# Patient Record
Sex: FEMALE | Race: WHITE | URBAN - METROPOLITAN AREA
[De-identification: names, ages, dates, MRNs, and addresses within clinical notes are randomized per-mention and may not be internally consistent; named-entity substitution may affect disease eponyms.]

---

## 2022-05-09 ENCOUNTER — OFFICE VISIT (OUTPATIENT)
Dept: URBAN - METROPOLITAN AREA CLINIC 32 | Facility: CLINIC | Age: 42
End: 2022-05-09
Payer: COMMERCIAL

## 2022-05-09 DIAGNOSIS — H33.311 HORSESHOE TEAR OF RETINA WITHOUT DETACHMENT, RIGHT EYE: Primary | ICD-10-CM

## 2022-05-09 PROCEDURE — 92134 CPTRZ OPH DX IMG PST SGM RTA: CPT | Performed by: OPHTHALMOLOGY

## 2022-05-09 PROCEDURE — 92004 COMPRE OPH EXAM NEW PT 1/>: CPT | Performed by: OPHTHALMOLOGY

## 2022-05-09 ASSESSMENT — KERATOMETRY
OS: 44.25
OD: 45.50

## 2022-05-09 ASSESSMENT — INTRAOCULAR PRESSURE
OD: 23
OS: 20

## 2022-05-09 NOTE — IMPRESSION/PLAN
Impression: Horseshoe tear of retina without detachment, right eye: H33.311. Plan:  ? 2 sup HSTs today with CL exam 
  Return tomorrow CG for optos OD + barrier laser  RD return precautions discussed

## 2022-05-10 ENCOUNTER — OFFICE VISIT (OUTPATIENT)
Dept: URBAN - METROPOLITAN AREA CLINIC 17 | Facility: CLINIC | Age: 42
End: 2022-05-10
Payer: COMMERCIAL

## 2022-05-10 PROCEDURE — 67145 PROPH RTA DTCHMNT PC: CPT | Performed by: OPHTHALMOLOGY

## 2022-05-10 ASSESSMENT — INTRAOCULAR PRESSURE
OS: 18
OD: 18

## 2022-05-10 NOTE — IMPRESSION/PLAN
Impression: Horseshoe tear of retina without detachment, right eye: H33.311. Plan: ? 2 sup HSTs today with CL exam 
  Barrier OD today  2w Cornelia Grater DIL OD

 RD return precautions discussed

## 2022-10-11 ENCOUNTER — OFFICE VISIT (OUTPATIENT)
Dept: URBAN - METROPOLITAN AREA CLINIC 22 | Facility: CLINIC | Age: 42
End: 2022-10-11
Payer: COMMERCIAL

## 2022-10-11 DIAGNOSIS — H15.102 EPISCLERITIS OF LEFT EYE: Primary | ICD-10-CM

## 2022-10-11 PROCEDURE — 99204 OFFICE O/P NEW MOD 45 MIN: CPT | Performed by: STUDENT IN AN ORGANIZED HEALTH CARE EDUCATION/TRAINING PROGRAM

## 2022-10-11 RX ORDER — PREDNISOLONE ACETATE 10 MG/ML
1 % SUSPENSION/ DROPS OPHTHALMIC
Qty: 5 | Refills: 1 | Status: ACTIVE
Start: 2022-10-11

## 2022-10-11 ASSESSMENT — INTRAOCULAR PRESSURE
OS: 19
OD: 21

## 2022-10-11 NOTE — IMPRESSION/PLAN
Impression: Episcleritis of left eye: H15.102. Plan: Discussed findings and educated patient on condition. H/o psoriatic arthritis; patient has been off treatment, in process of establishing care with a new rheumatologist. Patient reports 2 episodes of scleritis in the past. 

Rx pred QID OS x 1 week, BID x 1 week. RTC 2 weeks for follow-up. Contact clinic sooner if symptoms worsen.

## 2022-10-14 ENCOUNTER — OFFICE VISIT (OUTPATIENT)
Dept: URBAN - METROPOLITAN AREA CLINIC 22 | Facility: CLINIC | Age: 42
End: 2022-10-14
Payer: COMMERCIAL

## 2022-10-14 DIAGNOSIS — H15.013 ANTERIOR SCLERITIS, BILATERAL: Primary | ICD-10-CM

## 2022-10-14 PROCEDURE — 99214 OFFICE O/P EST MOD 30 MIN: CPT | Performed by: STUDENT IN AN ORGANIZED HEALTH CARE EDUCATION/TRAINING PROGRAM

## 2022-10-14 RX ORDER — METHYLPREDNISOLONE 4 MG/1
4 MG TABLET ORAL
Qty: 21 | Refills: 0 | Status: ACTIVE
Start: 2022-10-14

## 2022-10-14 ASSESSMENT — INTRAOCULAR PRESSURE
OD: 20
OS: 20

## 2022-10-14 NOTE — IMPRESSION/PLAN
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 Plan: Discussed findings and educated patient on condition. H/o psoriatic arthritis; patient has been off treatment, in process of establishing care with a new rheumatologist. Patient reports 2 episodes of scleritis in the past. She reports she saw PCP yesterday who ordered STAT referral to rheumatology as arthritis is flaring in general. 

Rx Medrol dose pack. Continue pred q2-3h OU. RTC 3 days for follow-up. Contact clinic sooner if symptoms worsen.

## 2022-10-25 ENCOUNTER — OFFICE VISIT (OUTPATIENT)
Dept: URBAN - METROPOLITAN AREA CLINIC 22 | Facility: CLINIC | Age: 42
End: 2022-10-25
Payer: COMMERCIAL

## 2022-10-25 DIAGNOSIS — H15.013 ANTERIOR SCLERITIS, BILATERAL: Primary | ICD-10-CM

## 2022-10-25 PROCEDURE — 99214 OFFICE O/P EST MOD 30 MIN: CPT | Performed by: STUDENT IN AN ORGANIZED HEALTH CARE EDUCATION/TRAINING PROGRAM

## 2022-10-25 ASSESSMENT — INTRAOCULAR PRESSURE
OD: 24
OS: 24

## 2022-10-25 NOTE — IMPRESSION/PLAN
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 Plan: Signs/symptoms improving. H/o psoriatic arthritis; patient has been off treatment, in process of establishing care with a new rheumatologist. Patient reports 2 episodes of scleritis in the past. She reports she just received rheumatology referral and is in process of getting appt scheduled. Continue pred q2-3h OU. IOP slightly elevated today. Start Simbrinza TID OU (sample given). Hx of SARY, denies kidney dz/sulfa allergy. RTC 1 week for follow-up. Contact clinic sooner if symptoms worsen.

## 2022-11-04 ENCOUNTER — OFFICE VISIT (OUTPATIENT)
Dept: URBAN - METROPOLITAN AREA CLINIC 22 | Facility: CLINIC | Age: 42
End: 2022-11-04
Payer: COMMERCIAL

## 2022-11-04 DIAGNOSIS — H15.013 ANTERIOR SCLERITIS, BILATERAL: Primary | ICD-10-CM

## 2022-11-04 PROCEDURE — 99214 OFFICE O/P EST MOD 30 MIN: CPT | Performed by: STUDENT IN AN ORGANIZED HEALTH CARE EDUCATION/TRAINING PROGRAM

## 2022-11-04 ASSESSMENT — INTRAOCULAR PRESSURE
OS: 22
OS: 20
OD: 26
OD: 22

## 2022-11-04 NOTE — IMPRESSION/PLAN
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 Plan: Discussed today's findings. Only mild injection OS, dry eye signs OU noted. Previous Medrol pack had helped, but since then patient reports symptoms are not improving. Will refer to MD for consult. H/o psoriatic arthritis; patient has been off treatment, in process of establishing care with a new rheumatologist. Patient reports 2 episodes of scleritis in the past. She reports she has appt w/ rheum on 11/28. Continue pred q2-3h OU. Continue Simbrinza TID OU (sample given). Rx PFAT QID OU (sample given). Hx of SARY, denies kidney dz/sulfa allergy.

## 2022-11-18 ENCOUNTER — OFFICE VISIT (OUTPATIENT)
Dept: URBAN - METROPOLITAN AREA CLINIC 33 | Facility: CLINIC | Age: 42
End: 2022-11-18
Payer: COMMERCIAL

## 2022-11-18 DIAGNOSIS — H40.053 OCULAR HYPERTENSION, BILATERAL: ICD-10-CM

## 2022-11-18 DIAGNOSIS — H15.013 ANTERIOR SCLERITIS, BILATERAL: Primary | ICD-10-CM

## 2022-11-18 PROCEDURE — 99213 OFFICE O/P EST LOW 20 MIN: CPT | Performed by: OPHTHALMOLOGY

## 2022-11-18 RX ORDER — HYDROCODONE BITARTRATE AND ACETAMINOPHEN 10; 325 MG/1; MG/1
TABLET ORAL
Qty: 16 | Refills: 0 | Status: ACTIVE
Start: 2022-11-18

## 2022-11-18 ASSESSMENT — INTRAOCULAR PRESSURE
OS: 32
OD: 32

## 2022-11-18 NOTE — IMPRESSION/PLAN
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 Condition: quality of life issue. *steroid responder ** Brimonidine is causing irritation Plan: No active inflammation at this time upon examination. Discussed diagnosis in detail with patient. Discussed treatment options with patient. Discussed topical steroids vs systemic steroids. Discussed possibility of elevated IOP and blood sugar associated with systemic steroids. Patient states she has upcoming appointment with new Rheumatologist on November 28th. Patient understands that she needs to resume treatment for Psoriatic Arthritis. Oral steroids not recommended at this time.

## 2022-11-18 NOTE — IMPRESSION/PLAN
Impression: Ocular hypertension, bilateral: H40.053. Plan: Discussed diagnosis in detail with patient. Discussed treatment options with patient. Continue Brimonidine TID OU. Start PF ATS OU. Recommend glaucoma consult with Dr Dwayne Scheuermann to further evaluate and treat.

## 2022-11-23 ENCOUNTER — OFFICE VISIT (OUTPATIENT)
Dept: URBAN - METROPOLITAN AREA CLINIC 23 | Facility: CLINIC | Age: 42
End: 2022-11-23
Payer: COMMERCIAL

## 2022-11-23 DIAGNOSIS — H40.053 OCULAR HYPERTENSION, BILATERAL: Primary | ICD-10-CM

## 2022-11-23 DIAGNOSIS — H15.013 ANTERIOR SCLERITIS, BILATERAL: ICD-10-CM

## 2022-11-23 PROCEDURE — 92020 GONIOSCOPY: CPT | Performed by: OPHTHALMOLOGY

## 2022-11-23 PROCEDURE — 92014 COMPRE OPH EXAM EST PT 1/>: CPT | Performed by: OPHTHALMOLOGY

## 2022-11-23 PROCEDURE — 76514 ECHO EXAM OF EYE THICKNESS: CPT | Performed by: OPHTHALMOLOGY

## 2022-11-23 RX ORDER — DORZOLAMIDE HYDROCHLORIDE AND TIMOLOL MALEATE 20; 5 MG/ML; MG/ML
SOLUTION/ DROPS OPHTHALMIC
Qty: 5 | Refills: 3 | Status: ACTIVE
Start: 2022-11-23

## 2022-11-23 ASSESSMENT — INTRAOCULAR PRESSURE
OS: 30
OD: 30

## 2022-11-23 NOTE — IMPRESSION/PLAN
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 Condition: quality of life issue. *steroid responder ** Brimonidine is causing irritation Plan: No active inflammation at this time upon examination. Discussed diagnosis in detail with patient. Discussed treatment options with patient. Discussed topical steroids vs systemic steroids. Discussed possibility of elevated IOP and blood sugar associated with systemic steroids. Patient states she has upcoming appointment with new Rheumatologist on November 28th. Patient understands that she needs to resume treatment for Psoriatic Arthritis. Oral steroids not recommended at this time.   
If steroids needed in the future- needs to return in 1 month for IOP check

## 2022-11-23 NOTE — IMPRESSION/PLAN
Impression: Ocular hypertension, bilateral: H40.053.
h/o steroid response Plan: Pt has Ocular hypertension Gonio :  CBB, 1+      Pachs:  586/586    Today's IOP : 30/30   Tmax :  32/32 Target IOP 21 or less OU
+ Fhx of Glaucoma Left eye is the better seeing eye HVF: not on file C/D: 0.2x0.2//0.3x0.3 OCT: 87/92 (5/9/22) Pt denies Sulfa Allergy   // Pt denies Lung /Heart dx Plan :
Continue START Cosopt BID OU
STOP Brimonidine 2/2 no response with IOP 1. Discussed diagnosis with patient. 2. IOP continues to be high. Will have pt start Cosopt 3.  RTC in 1 month for IOP check w/ Dr. Ruma Gutierrez for IOP check - if IOP is doing well can try to D/C - most likely all a steroid response (pt needed steroid 2/2 scleritis)

## 2022-12-27 ENCOUNTER — OFFICE VISIT (OUTPATIENT)
Dept: URBAN - METROPOLITAN AREA CLINIC 22 | Facility: CLINIC | Age: 42
End: 2022-12-27
Payer: COMMERCIAL

## 2022-12-27 DIAGNOSIS — H40.053 OCULAR HYPERTENSION, BILATERAL: Primary | ICD-10-CM

## 2022-12-27 DIAGNOSIS — H04.123 DRY EYE SYNDROME OF BILATERAL LACRIMAL GLANDS: ICD-10-CM

## 2022-12-27 PROCEDURE — 99214 OFFICE O/P EST MOD 30 MIN: CPT | Performed by: STUDENT IN AN ORGANIZED HEALTH CARE EDUCATION/TRAINING PROGRAM

## 2022-12-27 ASSESSMENT — INTRAOCULAR PRESSURE
OD: 22
OS: 22

## 2022-12-27 NOTE — IMPRESSION/PLAN
Impression: Ocular hypertension, bilateral: H40.053.
-- h/o steroid response
-- IOP today 22/22 (Tmax 32/32) -- pachs 586/586
-- tx: Cosopt BID OU
-- prev: poor response brimonidine Plan: Discussed today's findings. IOP improved today w/ Cosopt. Patient reports improvement in symptoms since rheumatologist started treatment for psoriatic arthritis. She reports a high dose steroid taper, thinks she may be on 5mg prednisone daily long-term. Will continue to monitor IOP due to hx of steroid response. RTC in 1 mo for IOP check.

## 2023-01-25 ENCOUNTER — OFFICE VISIT (OUTPATIENT)
Dept: URBAN - METROPOLITAN AREA CLINIC 22 | Facility: CLINIC | Age: 43
End: 2023-01-25
Payer: COMMERCIAL

## 2023-01-25 DIAGNOSIS — H15.102 EPISCLERITIS OF LEFT EYE: Primary | ICD-10-CM

## 2023-01-25 DIAGNOSIS — H40.053 OCULAR HYPERTENSION, BILATERAL: ICD-10-CM

## 2023-01-25 PROCEDURE — 99214 OFFICE O/P EST MOD 30 MIN: CPT | Performed by: STUDENT IN AN ORGANIZED HEALTH CARE EDUCATION/TRAINING PROGRAM

## 2023-01-25 RX ORDER — LOTEPREDNOL ETABONATE 5 MG/ML
0.5 % SUSPENSION/ DROPS OPHTHALMIC
Qty: 5 | Refills: 1 | Status: ACTIVE
Start: 2023-01-25

## 2023-01-25 ASSESSMENT — INTRAOCULAR PRESSURE
OD: 17
OS: 23
OS: 17
OD: 20

## 2023-01-25 NOTE — IMPRESSION/PLAN
Impression: Ocular hypertension, bilateral: H40.053.
-- h/o steroid response
-- IOP today 20/23 (Tmax 32/32) -- pachs 586/586
-- tx: Cosopt BID OU
-- prev: poor response brimonidine Plan: Discussed today's findings. Patient was off drops for about 1 mo, just re-started today. See above. Cont Cosopt BID OU. RTC in 1 mo for IOP check.

## 2023-01-25 NOTE — IMPRESSION/PLAN
Impression: Episcleritis of left eye: H15.102. Plan: Discussed findings. Hx of steroid response. Start loteprednol QID OS x 1-2 weeks. Continue Cosopt BID OU. Patient reports prednisone and methotrexate caused blood sugar to spike; she was hospitalized for DKA and pancreatitis few weeks ago. She saw rheumatology yesterday and states she has had to d/c those treatments for now (next appt 1 mo). She is also following up w/ endocrinology as BG has remained around 390 despite changes in current tx. Cont care w/ rheumatology & endocrinology.

## 2023-04-13 ENCOUNTER — OFFICE VISIT (OUTPATIENT)
Dept: URBAN - METROPOLITAN AREA CLINIC 22 | Facility: CLINIC | Age: 43
End: 2023-04-13
Payer: COMMERCIAL

## 2023-04-13 DIAGNOSIS — H40.053 OCULAR HYPERTENSION, BILATERAL: Primary | ICD-10-CM

## 2023-04-13 PROCEDURE — 92012 INTRM OPH EXAM EST PATIENT: CPT | Performed by: STUDENT IN AN ORGANIZED HEALTH CARE EDUCATION/TRAINING PROGRAM

## 2023-04-13 ASSESSMENT — INTRAOCULAR PRESSURE
OD: 20
OS: 20

## 2023-04-13 NOTE — IMPRESSION/PLAN
Impression: Ocular hypertension, bilateral: H40.053.
-- h/o steroid response
-- IOP today 20/20 (Tmax 32/32) -- pachs 586/586
-- tx: Cosopt BID OU
-- prev: poor response brimonidine Plan: Discussed today's findings. Good response, improved compliance to Cosopt. Continue Cosopt BID OU. Stressed importance of compliance. Patient has referral for new endocrinologist. 
Next set of labs in 1 mo. If BG better controlled/stable, can update refraction. RTC 6 weeks for refraction; or postpone if still uncontrolled.

## 2023-07-25 ENCOUNTER — OFFICE VISIT (OUTPATIENT)
Dept: URBAN - METROPOLITAN AREA CLINIC 22 | Facility: CLINIC | Age: 43
End: 2023-07-25
Payer: COMMERCIAL

## 2023-07-25 DIAGNOSIS — H40.053 OCULAR HYPERTENSION, BILATERAL: Primary | ICD-10-CM

## 2023-07-25 PROCEDURE — 99213 OFFICE O/P EST LOW 20 MIN: CPT | Performed by: STUDENT IN AN ORGANIZED HEALTH CARE EDUCATION/TRAINING PROGRAM

## 2023-07-25 ASSESSMENT — INTRAOCULAR PRESSURE
OS: 21
OD: 20

## 2023-10-02 ENCOUNTER — OFFICE VISIT (OUTPATIENT)
Dept: URBAN - METROPOLITAN AREA CLINIC 22 | Facility: CLINIC | Age: 43
End: 2023-10-02
Payer: COMMERCIAL

## 2023-10-02 DIAGNOSIS — H31.091 OTHER CHORIORETINAL SCARS, RIGHT EYE: ICD-10-CM

## 2023-10-02 DIAGNOSIS — H40.053 OCULAR HYPERTENSION, BILATERAL: Primary | ICD-10-CM

## 2023-10-02 PROCEDURE — 99213 OFFICE O/P EST LOW 20 MIN: CPT | Performed by: STUDENT IN AN ORGANIZED HEALTH CARE EDUCATION/TRAINING PROGRAM

## 2023-10-02 ASSESSMENT — INTRAOCULAR PRESSURE
OS: 19
OD: 16

## 2023-10-02 ASSESSMENT — VISUAL ACUITY
OD: 20/25
OS: 20/20

## 2024-04-26 ENCOUNTER — OFFICE VISIT (OUTPATIENT)
Dept: URBAN - METROPOLITAN AREA CLINIC 22 | Facility: CLINIC | Age: 44
End: 2024-04-26
Payer: COMMERCIAL

## 2024-04-26 DIAGNOSIS — H40.053 OCULAR HYPERTENSION, BILATERAL: Primary | ICD-10-CM

## 2024-04-26 PROCEDURE — 92083 EXTENDED VISUAL FIELD XM: CPT | Performed by: STUDENT IN AN ORGANIZED HEALTH CARE EDUCATION/TRAINING PROGRAM

## 2024-04-26 PROCEDURE — 99213 OFFICE O/P EST LOW 20 MIN: CPT | Performed by: STUDENT IN AN ORGANIZED HEALTH CARE EDUCATION/TRAINING PROGRAM

## 2024-04-26 RX ORDER — DORZOLAMIDE HYDROCHLORIDE AND TIMOLOL MALEATE 20; 5 MG/ML; MG/ML
SOLUTION/ DROPS OPHTHALMIC
Qty: 10 | Refills: 1 | Status: ACTIVE
Start: 2024-04-26

## 2024-04-26 ASSESSMENT — INTRAOCULAR PRESSURE
OS: 22
OD: 20

## 2024-08-22 ENCOUNTER — OFFICE VISIT (OUTPATIENT)
Dept: URBAN - NONMETROPOLITAN AREA CLINIC 10 | Facility: CLINIC | Age: 44
End: 2024-08-22
Payer: COMMERCIAL

## 2024-08-22 DIAGNOSIS — H40.053 OCULAR HYPERTENSION, BILATERAL: Primary | ICD-10-CM

## 2024-08-22 DIAGNOSIS — E11.9 DIABETES MELLITUS TYPE 2 WITHOUT MENTION OF COMPLICATION: ICD-10-CM

## 2024-08-22 PROCEDURE — 92133 CPTRZD OPH DX IMG PST SGM ON: CPT | Performed by: OPTOMETRIST

## 2024-08-22 PROCEDURE — 92014 COMPRE OPH EXAM EST PT 1/>: CPT | Performed by: OPTOMETRIST

## 2024-08-22 RX ORDER — DORZOLAMIDE HYDROCHLORIDE AND TIMOLOL MALEATE 20; 5 MG/ML; MG/ML
SOLUTION/ DROPS OPHTHALMIC
Qty: 10 | Refills: 5 | Status: ACTIVE
Start: 2024-08-22

## 2024-08-22 ASSESSMENT — INTRAOCULAR PRESSURE
OD: 15
OS: 18
OS: 16
OD: 18

## 2024-08-22 ASSESSMENT — VISUAL ACUITY
OS: 20/20
OD: 20/30

## 2024-08-22 ASSESSMENT — KERATOMETRY
OS: 45.00
OD: 45.50

## 2025-01-02 ENCOUNTER — OFFICE VISIT (OUTPATIENT)
Dept: URBAN - NONMETROPOLITAN AREA CLINIC 10 | Facility: CLINIC | Age: 45
End: 2025-01-02
Payer: COMMERCIAL

## 2025-01-02 DIAGNOSIS — H52.13 MYOPIA, BILATERAL: ICD-10-CM

## 2025-01-02 DIAGNOSIS — H40.053 OCULAR HYPERTENSION, BILATERAL: Primary | ICD-10-CM

## 2025-01-02 PROCEDURE — 92083 EXTENDED VISUAL FIELD XM: CPT | Performed by: OPTOMETRIST

## 2025-01-02 PROCEDURE — 99213 OFFICE O/P EST LOW 20 MIN: CPT | Performed by: OPTOMETRIST

## 2025-01-02 PROCEDURE — 92133 CPTRZD OPH DX IMG PST SGM ON: CPT | Performed by: OPTOMETRIST

## 2025-01-02 PROCEDURE — 92020 GONIOSCOPY: CPT | Performed by: OPTOMETRIST

## 2025-01-02 RX ORDER — DORZOLAMIDE HYDROCHLORIDE AND TIMOLOL MALEATE 20; 5 MG/ML; MG/ML
SOLUTION/ DROPS OPHTHALMIC
Qty: 10 | Refills: 5 | Status: ACTIVE
Start: 2025-01-02

## 2025-01-02 ASSESSMENT — VISUAL ACUITY
OS: 20/25
OD: 20/30

## 2025-01-02 ASSESSMENT — INTRAOCULAR PRESSURE
OD: 8
OD: 13
OS: 9
OS: 12

## 2025-07-10 ENCOUNTER — OFFICE VISIT (OUTPATIENT)
Dept: URBAN - NONMETROPOLITAN AREA CLINIC 10 | Facility: CLINIC | Age: 45
End: 2025-07-10
Payer: COMMERCIAL

## 2025-07-10 DIAGNOSIS — H40.053 OCULAR HYPERTENSION, BILATERAL: Primary | ICD-10-CM

## 2025-07-10 DIAGNOSIS — H04.123 DRY EYE SYNDROME OF BILATERAL LACRIMAL GLANDS: ICD-10-CM

## 2025-07-10 DIAGNOSIS — H52.13 MYOPIA, BILATERAL: ICD-10-CM

## 2025-07-10 PROCEDURE — 92015 DETERMINE REFRACTIVE STATE: CPT | Performed by: OPTOMETRIST

## 2025-07-10 PROCEDURE — 92014 COMPRE OPH EXAM EST PT 1/>: CPT | Performed by: OPTOMETRIST

## 2025-07-10 RX ORDER — DORZOLAMIDE HYDROCHLORIDE AND TIMOLOL MALEATE 20; 5 MG/ML; MG/ML
SOLUTION/ DROPS OPHTHALMIC
Qty: 10 | Refills: 5 | Status: ACTIVE
Start: 2025-07-10

## 2025-07-10 ASSESSMENT — VISUAL ACUITY
OD: 20/30
OS: 20/25

## 2025-07-10 ASSESSMENT — INTRAOCULAR PRESSURE
OS: 10
OD: 10